# Patient Record
Sex: FEMALE | Race: WHITE | NOT HISPANIC OR LATINO | Employment: OTHER | URBAN - NONMETROPOLITAN AREA
[De-identification: names, ages, dates, MRNs, and addresses within clinical notes are randomized per-mention and may not be internally consistent; named-entity substitution may affect disease eponyms.]

---

## 2017-09-08 ENCOUNTER — OFFICE VISIT (OUTPATIENT)
Dept: RETAIL CLINIC | Facility: CLINIC | Age: 67
End: 2017-09-08

## 2017-09-08 DIAGNOSIS — Z23 NEED FOR DIPHTHERIA-TETANUS-PERTUSSIS (TDAP) VACCINE, ADULT/ADOLESCENT: Primary | ICD-10-CM

## 2017-09-16 ENCOUNTER — APPOINTMENT (OUTPATIENT)
Dept: GENERAL RADIOLOGY | Age: 67
End: 2017-09-16
Payer: MEDICARE

## 2017-09-16 ENCOUNTER — HOSPITAL ENCOUNTER (EMERGENCY)
Age: 67
Discharge: HOME | End: 2017-09-16
Attending: EMERGENCY MEDICINE
Payer: MEDICARE

## 2017-09-16 VITALS
WEIGHT: 148 LBS | DIASTOLIC BLOOD PRESSURE: 60 MMHG | RESPIRATION RATE: 19 BRPM | BODY MASS INDEX: 24.66 KG/M2 | TEMPERATURE: 98.1 F | HEART RATE: 80 BPM | SYSTOLIC BLOOD PRESSURE: 150 MMHG | HEIGHT: 65 IN | OXYGEN SATURATION: 94 %

## 2017-09-16 DIAGNOSIS — S52.502A DISPLACED FRACTURE OF DISTAL END OF LEFT RADIUS, INITIAL ENCOUNTER: Primary | ICD-10-CM

## 2017-09-16 DIAGNOSIS — S22.42XA FRACTURE OF MULTIPLE RIBS OF LEFT SIDE, INITIAL ENCOUNTER: ICD-10-CM

## 2017-09-16 PROCEDURE — 71010 XR CHEST PORTABLE: CPT

## 2017-09-16 PROCEDURE — 6360000002 HC RX W HCPCS: Performed by: EMERGENCY MEDICINE

## 2017-09-16 PROCEDURE — 73110 X-RAY EXAM OF WRIST: CPT

## 2017-09-16 PROCEDURE — 25605 CLTX DST RDL FX/EPHYS SEP W/: CPT

## 2017-09-16 PROCEDURE — 99283 EMERGENCY DEPT VISIT LOW MDM: CPT | Performed by: EMERGENCY MEDICINE

## 2017-09-16 PROCEDURE — 99284 EMERGENCY DEPT VISIT MOD MDM: CPT

## 2017-09-16 PROCEDURE — 99152 MOD SED SAME PHYS/QHP 5/>YRS: CPT | Performed by: EMERGENCY MEDICINE

## 2017-09-16 PROCEDURE — 25600 CLTX DST RDL FX/EPHYS SEP WO: CPT | Performed by: EMERGENCY MEDICINE

## 2017-09-16 PROCEDURE — 96374 THER/PROPH/DIAG INJ IV PUSH: CPT

## 2017-09-16 RX ORDER — ATORVASTATIN CALCIUM 10 MG/1
10 TABLET, FILM COATED ORAL DAILY
COMMUNITY

## 2017-09-16 RX ORDER — LOSARTAN POTASSIUM 25 MG/1
25 TABLET ORAL DAILY
COMMUNITY

## 2017-09-16 RX ORDER — KETOROLAC TROMETHAMINE 30 MG/ML
30 INJECTION, SOLUTION INTRAMUSCULAR; INTRAVENOUS ONCE
Status: COMPLETED | OUTPATIENT
Start: 2017-09-16 | End: 2017-09-16

## 2017-09-16 RX ORDER — HYDROCODONE BITARTRATE AND ACETAMINOPHEN 5; 325 MG/1; MG/1
1 TABLET ORAL EVERY 6 HOURS PRN
Qty: 20 TABLET | Refills: 0 | Status: SHIPPED | OUTPATIENT
Start: 2017-09-16 | End: 2017-09-23

## 2017-09-16 RX ORDER — MORPHINE SULFATE 10 MG/ML
10 INJECTION, SOLUTION INTRAMUSCULAR; INTRAVENOUS ONCE
Status: COMPLETED | OUTPATIENT
Start: 2017-09-16 | End: 2017-09-16

## 2017-09-16 RX ORDER — ONDANSETRON 2 MG/ML
INJECTION INTRAMUSCULAR; INTRAVENOUS
Status: DISCONTINUED
Start: 2017-09-16 | End: 2017-09-16 | Stop reason: HOSPADM

## 2017-09-16 RX ORDER — KETOROLAC TROMETHAMINE 30 MG/ML
60 INJECTION, SOLUTION INTRAMUSCULAR; INTRAVENOUS ONCE
Status: DISCONTINUED | OUTPATIENT
Start: 2017-09-16 | End: 2017-09-16

## 2017-09-16 RX ORDER — MIDAZOLAM HYDROCHLORIDE 1 MG/ML
2 INJECTION INTRAMUSCULAR; INTRAVENOUS ONCE
Status: DISCONTINUED | OUTPATIENT
Start: 2017-09-16 | End: 2017-09-16 | Stop reason: HOSPADM

## 2017-09-16 RX ORDER — MIDAZOLAM HYDROCHLORIDE 1 MG/ML
5 INJECTION INTRAMUSCULAR; INTRAVENOUS ONCE
Status: COMPLETED | OUTPATIENT
Start: 2017-09-16 | End: 2017-09-16

## 2017-09-16 RX ADMIN — MORPHINE SULFATE 10 MG: 10 INJECTION INTRAVENOUS at 06:31

## 2017-09-16 RX ADMIN — MIDAZOLAM 5 MG: 1 INJECTION INTRAMUSCULAR; INTRAVENOUS at 06:30

## 2017-09-16 RX ADMIN — KETOROLAC TROMETHAMINE 30 MG: 30 INJECTION, SOLUTION INTRAMUSCULAR at 06:11

## 2017-09-16 ASSESSMENT — PAIN SCALES - GENERAL
PAINLEVEL_OUTOF10: 5
PAINLEVEL_OUTOF10: 5
PAINLEVEL_OUTOF10: 6

## 2017-09-16 ASSESSMENT — ENCOUNTER SYMPTOMS
RESPIRATORY NEGATIVE: 1
BACK PAIN: 0
EYES NEGATIVE: 1

## 2020-01-31 ENCOUNTER — TRANSCRIBE ORDERS (OUTPATIENT)
Dept: ADMINISTRATIVE | Facility: HOSPITAL | Age: 70
End: 2020-01-31

## 2020-01-31 DIAGNOSIS — G47.33 OBSTRUCTIVE SLEEP APNEA (ADULT) (PEDIATRIC): Primary | ICD-10-CM

## 2020-01-31 DIAGNOSIS — I10 ESSENTIAL (PRIMARY) HYPERTENSION: ICD-10-CM

## 2020-01-31 DIAGNOSIS — R01.1 CARDIAC MURMUR, UNSPECIFIED: ICD-10-CM

## 2020-02-10 ENCOUNTER — HOSPITAL ENCOUNTER (OUTPATIENT)
Dept: CARDIOLOGY | Facility: HOSPITAL | Age: 70
Discharge: HOME | End: 2020-02-10
Admitting: PHYSICIAN ASSISTANT

## 2020-02-10 VITALS
DIASTOLIC BLOOD PRESSURE: 51 MMHG | WEIGHT: 150 LBS | HEIGHT: 64 IN | SYSTOLIC BLOOD PRESSURE: 157 MMHG | BODY MASS INDEX: 25.61 KG/M2

## 2020-02-10 DIAGNOSIS — I10 ESSENTIAL (PRIMARY) HYPERTENSION: ICD-10-CM

## 2020-02-10 DIAGNOSIS — G47.33 OBSTRUCTIVE SLEEP APNEA (ADULT) (PEDIATRIC): ICD-10-CM

## 2020-02-10 DIAGNOSIS — R01.1 CARDIAC MURMUR, UNSPECIFIED: ICD-10-CM

## 2020-02-10 PROCEDURE — 93306 TTE W/DOPPLER COMPLETE: CPT | Performed by: INTERNAL MEDICINE

## 2020-02-10 PROCEDURE — 93306 TTE W/DOPPLER COMPLETE: CPT

## 2020-02-12 LAB
BH CV ECHO MEAS - AO MAX PG (FULL): 3.9 MMHG
BH CV ECHO MEAS - AO MAX PG: 8.5 MMHG
BH CV ECHO MEAS - AO MEAN PG (FULL): 2 MMHG
BH CV ECHO MEAS - AO MEAN PG: 4 MMHG
BH CV ECHO MEAS - AO ROOT AREA (BSA CORRECTED): 1.6
BH CV ECHO MEAS - AO ROOT AREA: 6.2 CM^2
BH CV ECHO MEAS - AO ROOT DIAM: 2.8 CM
BH CV ECHO MEAS - AO V2 MAX: 146 CM/SEC
BH CV ECHO MEAS - AO V2 MEAN: 88.7 CM/SEC
BH CV ECHO MEAS - AO V2 VTI: 32.1 CM
BH CV ECHO MEAS - AVA(I,A): 2.7 CM^2
BH CV ECHO MEAS - AVA(I,D): 2.7 CM^2
BH CV ECHO MEAS - AVA(V,A): 2.3 CM^2
BH CV ECHO MEAS - AVA(V,D): 2.3 CM^2
BH CV ECHO MEAS - BSA(HAYCOCK): 1.8 M^2
BH CV ECHO MEAS - BSA: 1.7 M^2
BH CV ECHO MEAS - BZI_BMI: 25.7 KILOGRAMS/M^2
BH CV ECHO MEAS - BZI_METRIC_HEIGHT: 162.6 CM
BH CV ECHO MEAS - BZI_METRIC_WEIGHT: 68 KG
BH CV ECHO MEAS - EDV(CUBED): 67.9 ML
BH CV ECHO MEAS - EDV(MOD-SP4): 66.9 ML
BH CV ECHO MEAS - EDV(TEICH): 73.4 ML
BH CV ECHO MEAS - EF(CUBED): 77.8 %
BH CV ECHO MEAS - EF(MOD-SP4): 60.1 %
BH CV ECHO MEAS - EF(TEICH): 70.5 %
BH CV ECHO MEAS - ESV(CUBED): 15.1 ML
BH CV ECHO MEAS - ESV(MOD-SP4): 26.7 ML
BH CV ECHO MEAS - ESV(TEICH): 21.7 ML
BH CV ECHO MEAS - FS: 39.5 %
BH CV ECHO MEAS - IVS/LVPW: 1.1
BH CV ECHO MEAS - IVSD: 1.1 CM
BH CV ECHO MEAS - LA DIMENSION: 3.7 CM
BH CV ECHO MEAS - LA/AO: 1.3
BH CV ECHO MEAS - LAT PEAK E' VEL: 9.7 CM/SEC
BH CV ECHO MEAS - LV DIASTOLIC VOL/BSA (35-75): 38.7 ML/M^2
BH CV ECHO MEAS - LV MASS(C)D: 132.9 GRAMS
BH CV ECHO MEAS - LV MASS(C)DI: 76.8 GRAMS/M^2
BH CV ECHO MEAS - LV MAX PG: 4.6 MMHG
BH CV ECHO MEAS - LV MEAN PG: 2 MMHG
BH CV ECHO MEAS - LV SYSTOLIC VOL/BSA (12-30): 15.4 ML/M^2
BH CV ECHO MEAS - LV V1 MAX: 107 CM/SEC
BH CV ECHO MEAS - LV V1 MEAN: 71.8 CM/SEC
BH CV ECHO MEAS - LV V1 VTI: 27.5 CM
BH CV ECHO MEAS - LVIDD: 4.1 CM
BH CV ECHO MEAS - LVIDS: 2.5 CM
BH CV ECHO MEAS - LVLD AP4: 8.1 CM
BH CV ECHO MEAS - LVLS AP4: 7 CM
BH CV ECHO MEAS - LVOT AREA (M): 3.1 CM^2
BH CV ECHO MEAS - LVOT AREA: 3.1 CM^2
BH CV ECHO MEAS - LVOT DIAM: 2 CM
BH CV ECHO MEAS - LVPWD: 0.94 CM
BH CV ECHO MEAS - MED PEAK E' VEL: 6.42 CM/SEC
BH CV ECHO MEAS - MV A MAX VEL: 106 CM/SEC
BH CV ECHO MEAS - MV DEC TIME: 0.26 SEC
BH CV ECHO MEAS - MV E MAX VEL: 97.9 CM/SEC
BH CV ECHO MEAS - MV E/A: 0.92
BH CV ECHO MEAS - RAP SYSTOLE: 5 MMHG
BH CV ECHO MEAS - RVSP: 26 MMHG
BH CV ECHO MEAS - SI(AO): 114.2 ML/M^2
BH CV ECHO MEAS - SI(CUBED): 30.5 ML/M^2
BH CV ECHO MEAS - SI(LVOT): 49.9 ML/M^2
BH CV ECHO MEAS - SI(MOD-SP4): 23.2 ML/M^2
BH CV ECHO MEAS - SI(TEICH): 29.9 ML/M^2
BH CV ECHO MEAS - SV(AO): 197.7 ML
BH CV ECHO MEAS - SV(CUBED): 52.8 ML
BH CV ECHO MEAS - SV(LVOT): 86.4 ML
BH CV ECHO MEAS - SV(MOD-SP4): 40.2 ML
BH CV ECHO MEAS - SV(TEICH): 51.7 ML
BH CV ECHO MEAS - TR MAX VEL: 229 CM/SEC
BH CV ECHO MEASUREMENTS AVERAGE E/E' RATIO: 12.15
LEFT ATRIUM VOLUME INDEX: 34.7 ML/M2
LEFT ATRIUM VOLUME: 60 CM3
MAXIMAL PREDICTED HEART RATE: 149 BPM
STRESS TARGET HR: 127 BPM

## 2024-01-25 ENCOUNTER — TRANSCRIBE ORDERS (OUTPATIENT)
Dept: ADMINISTRATIVE | Facility: HOSPITAL | Age: 74
End: 2024-01-25
Payer: MEDICARE

## 2024-01-25 DIAGNOSIS — R07.9 CHEST PAIN, UNSPECIFIED TYPE: Primary | ICD-10-CM

## 2024-01-31 ENCOUNTER — HOSPITAL ENCOUNTER (OUTPATIENT)
Dept: CARDIOLOGY | Facility: HOSPITAL | Age: 74
Discharge: HOME | End: 2024-01-31
Admitting: INTERNAL MEDICINE
Payer: MEDICARE

## 2024-01-31 VITALS
WEIGHT: 150 LBS | SYSTOLIC BLOOD PRESSURE: 163 MMHG | HEART RATE: 66 BPM | HEIGHT: 63 IN | DIASTOLIC BLOOD PRESSURE: 58 MMHG | BODY MASS INDEX: 26.58 KG/M2

## 2024-01-31 DIAGNOSIS — R07.9 CHEST PAIN, UNSPECIFIED TYPE: ICD-10-CM

## 2024-01-31 PROCEDURE — 25510000001 PERFLUTREN 6.52 MG/ML SUSPENSION: Performed by: INTERNAL MEDICINE

## 2024-01-31 PROCEDURE — 93350 STRESS TTE ONLY: CPT

## 2024-01-31 PROCEDURE — 93017 CV STRESS TEST TRACING ONLY: CPT

## 2024-01-31 RX ORDER — THIAMINE HCL 100 MG
TABLET ORAL DAILY
COMMUNITY

## 2024-01-31 RX ORDER — ACETAMINOPHEN 160 MG
2000 TABLET,DISINTEGRATING ORAL DAILY
COMMUNITY

## 2024-01-31 RX ORDER — OLMESARTAN MEDOXOMIL 20 MG/1
20 TABLET ORAL DAILY
COMMUNITY

## 2024-01-31 RX ORDER — PHENOL 1.4 %
1 AEROSOL, SPRAY (ML) MUCOUS MEMBRANE NIGHTLY
COMMUNITY

## 2024-01-31 RX ORDER — ATORVASTATIN CALCIUM 10 MG/1
10 TABLET, FILM COATED ORAL DAILY
COMMUNITY

## 2024-01-31 RX ORDER — ANTIOX #8/OM3/DHA/EPA/LUT/ZEAX 250-2.5 MG
1 CAPSULE ORAL 2 TIMES DAILY
COMMUNITY

## 2024-01-31 RX ORDER — OMEPRAZOLE 40 MG/1
40 CAPSULE, DELAYED RELEASE ORAL DAILY
COMMUNITY

## 2024-01-31 RX ORDER — MULTIVITAMIN WITH IRON
1 TABLET ORAL DAILY
COMMUNITY

## 2024-01-31 RX ADMIN — PERFLUTREN 8.48 MG: 6.52 INJECTION, SUSPENSION INTRAVENOUS at 14:35

## 2024-02-02 LAB
BH CV STRESS BP STAGE 1: NORMAL
BH CV STRESS BP STAGE 2: NORMAL
BH CV STRESS DURATION MIN STAGE 1: 3
BH CV STRESS DURATION MIN STAGE 2: 3
BH CV STRESS DURATION SEC STAGE 1: 0
BH CV STRESS DURATION SEC STAGE 2: 0
BH CV STRESS GRADE STAGE 1: 10
BH CV STRESS GRADE STAGE 2: 12
BH CV STRESS HR STAGE 1: 107
BH CV STRESS HR STAGE 2: 125
BH CV STRESS METS STAGE 1: 5
BH CV STRESS METS STAGE 2: 7.5
BH CV STRESS PROTOCOL 1: NORMAL
BH CV STRESS RECOVERY BP: NORMAL MMHG
BH CV STRESS RECOVERY HR: 80 BPM
BH CV STRESS SPEED STAGE 1: 1.7
BH CV STRESS SPEED STAGE 2: 2.5
BH CV STRESS STAGE 1: 1
BH CV STRESS STAGE 2: 2
MAXIMAL PREDICTED HEART RATE: 145 BPM
PERCENT MAX PREDICTED HR: 86.21 %
STRESS BASELINE BP: NORMAL MMHG
STRESS BASELINE HR: 66 BPM
STRESS PERCENT HR: 101 %
STRESS POST ESTIMATED WORKLOAD: 7.5 METS
STRESS POST EXERCISE DUR MIN: 6 MIN
STRESS POST EXERCISE DUR SEC: 0 SEC
STRESS POST PEAK BP: NORMAL MMHG
STRESS POST PEAK HR: 125 BPM
STRESS TARGET HR: 123 BPM

## 2024-02-06 ENCOUNTER — OFFICE VISIT (OUTPATIENT)
Dept: CARDIOLOGY | Facility: CLINIC | Age: 74
End: 2024-02-06
Payer: MEDICARE

## 2024-02-06 ENCOUNTER — PREP FOR SURGERY (OUTPATIENT)
Dept: OTHER | Facility: HOSPITAL | Age: 74
End: 2024-02-06
Payer: MEDICARE

## 2024-02-06 VITALS
SYSTOLIC BLOOD PRESSURE: 130 MMHG | OXYGEN SATURATION: 98 % | HEIGHT: 64 IN | BODY MASS INDEX: 26.46 KG/M2 | WEIGHT: 155 LBS | HEART RATE: 68 BPM | DIASTOLIC BLOOD PRESSURE: 58 MMHG

## 2024-02-06 DIAGNOSIS — E78.5 DYSLIPIDEMIA: ICD-10-CM

## 2024-02-06 DIAGNOSIS — I10 ESSENTIAL HYPERTENSION: ICD-10-CM

## 2024-02-06 DIAGNOSIS — R94.39 ABNORMAL STRESS ECHO: ICD-10-CM

## 2024-02-06 DIAGNOSIS — I20.89 CHRONIC STABLE ANGINA: Primary | ICD-10-CM

## 2024-02-06 PROBLEM — G47.33 OSA (OBSTRUCTIVE SLEEP APNEA): Status: ACTIVE | Noted: 2024-02-06

## 2024-02-06 PROBLEM — K21.9 GERD (GASTROESOPHAGEAL REFLUX DISEASE): Status: ACTIVE | Noted: 2024-02-06

## 2024-02-06 PROCEDURE — 93000 ELECTROCARDIOGRAM COMPLETE: CPT | Performed by: INTERNAL MEDICINE

## 2024-02-06 PROCEDURE — 3078F DIAST BP <80 MM HG: CPT | Performed by: INTERNAL MEDICINE

## 2024-02-06 PROCEDURE — 3075F SYST BP GE 130 - 139MM HG: CPT | Performed by: INTERNAL MEDICINE

## 2024-02-06 PROCEDURE — 99204 OFFICE O/P NEW MOD 45 MIN: CPT | Performed by: INTERNAL MEDICINE

## 2024-02-06 RX ORDER — NITROGLYCERIN 0.4 MG/1
TABLET SUBLINGUAL
Qty: 25 TABLET | Refills: 11 | Status: SHIPPED | OUTPATIENT
Start: 2024-02-06

## 2024-02-06 RX ORDER — SODIUM CHLORIDE 9 MG/ML
40 INJECTION, SOLUTION INTRAVENOUS AS NEEDED
OUTPATIENT
Start: 2024-02-06

## 2024-02-06 RX ORDER — ASPIRIN 81 MG/1
81 TABLET ORAL DAILY
Qty: 30 TABLET | Refills: 11 | Status: SHIPPED | OUTPATIENT
Start: 2024-02-06

## 2024-02-06 RX ORDER — SODIUM CHLORIDE 0.9 % (FLUSH) 0.9 %
10 SYRINGE (ML) INJECTION EVERY 12 HOURS SCHEDULED
OUTPATIENT
Start: 2024-02-06

## 2024-02-06 RX ORDER — SODIUM CHLORIDE 0.9 % (FLUSH) 0.9 %
10 SYRINGE (ML) INJECTION AS NEEDED
OUTPATIENT
Start: 2024-02-06

## 2024-02-06 RX ORDER — METOPROLOL SUCCINATE 25 MG/1
25 TABLET, EXTENDED RELEASE ORAL DAILY
Qty: 30 TABLET | Refills: 11 | Status: SHIPPED | OUTPATIENT
Start: 2024-02-06

## 2024-02-06 RX ORDER — ATORVASTATIN CALCIUM 20 MG/1
20 TABLET, FILM COATED ORAL DAILY
Qty: 30 TABLET | Refills: 11 | Status: SHIPPED | OUTPATIENT
Start: 2024-02-06

## 2024-02-06 NOTE — LETTER
February 6, 2024     Cabrera Robles MD  [address]  [facility] [address]  [facility] [address]    Patient: Sasha Jimenez   YOB: 1950   Date of Visit: 2/6/2024       Dear Cabrera Robles MD,    Thank you for referring Sasha Jimenez to me for evaluation. Below is a copy of my consult note.    If you have questions, please do not hesitate to call me. I look forward to following Sasha along with you.         Sincerely,        Mukund Posada MD        CC: No Recipients      Reason for Visit: Abnormal stress test    HPI:  Sasha Jimenez is a 73 y.o. female is being seen for consultation today at the request of Cabrera Robles MD for evaluation of an abnormal stress test.  Stress echo from 2/2/2024 reported nonlimiting chest discomfort with stress, 0.5 mm horizontal ST depression, and possible inferior wall hypokinesis.    The chest pain happens when she exerts herself.  It started about 6-8 months ago.  Light activity around the house does not bother her, but more strenuous consistent walking causes it to happen.  She describes it as a pressure like pain that radiates up to her stroke and is different than her acid reflux.  She feels more tired and sleepy lately.  She has sleep apnea and could not tolerate the mask.      Previous Cardiac Testing and Procedures:  -Echo (2/10/2020) EF 56- 60%, normal diastolic function, normal RV size and function, LA dilation, no significant valve dysfunction  -Stress echo (2/2/2024) resting hypertension with hypertensive response to stress, nonlimiting chest discomfort with stress, 0.5 mm horizontal ST depression, possible inferior wall hypokinesis with stress, intermediate risk    Lab data:  -Lipid panel (7/25/2023) total cholesterol 196, HDL 59, , triglycerides 81  -Lipid panel (1/12/2024) total cholesterol 197, HDL 58, , triglycerides 106  -BMP (1/12/2024) creatinine 0.73, potassium 4.8, sodium 140  -TSH (7/25/2023) 1.19    Patient Active  Problem List   Diagnosis   • Essential hypertension   • Dyslipidemia   • EVIE (obstructive sleep apnea)   • GERD (gastroesophageal reflux disease)       Social History     Tobacco Use   • Smoking status: Former     Types: Cigarettes   • Smokeless tobacco: Never   Vaping Use   • Vaping Use: Never used   Substance Use Topics   • Alcohol use: Never   • Drug use: Never       Family History   Problem Relation Age of Onset   • Heart attack Father    • Heart disease Father    • Heart failure Father        The following portions of the patient's history were reviewed and updated as appropriate: allergies, current medications, past family history, past medical history, past social history, past surgical history, and problem list.      Current Outpatient Medications:   •  atorvastatin (LIPITOR) 20 MG tablet, Take 1 tablet by mouth Daily., Disp: 30 tablet, Rfl: 11  •  Cholecalciferol (Vitamin D3) 50 MCG (2000 UT) capsule, Take 1 capsule by mouth Daily., Disp: , Rfl:   •  Magnesium 250 MG tablet, Take 1 tablet by mouth Daily., Disp: , Rfl:   •  Melatonin 10 MG tablet, Take 1 tablet by mouth Every Night., Disp: , Rfl:   •  multivitamins-minerals (PRESERVISION AREDS 2) capsule capsule, Take 1 capsule by mouth 2 (Two) Times a Day., Disp: , Rfl:   •  olmesartan (BENICAR) 20 MG tablet, Take 1 tablet by mouth Daily., Disp: , Rfl:   •  omeprazole (priLOSEC) 40 MG capsule, Take 1 capsule by mouth Daily., Disp: , Rfl:   •  vitamin B-12 (CYANOCOBALAMIN) 2500 MCG sublingual tablet tablet, Place  under the tongue Daily., Disp: , Rfl:   •  aspirin 81 MG EC tablet, Take 1 tablet by mouth Daily., Disp: 30 tablet, Rfl: 11  •  metoprolol succinate XL (TOPROL-XL) 25 MG 24 hr tablet, Take 1 tablet by mouth Daily., Disp: 30 tablet, Rfl: 11  •  nitroglycerin (NITROSTAT) 0.4 MG SL tablet, 1 under the tongue as needed for angina, may repeat q5mins for up three doses, Disp: 25 tablet, Rfl: 11    Review of Systems   Constitutional: Negative for chills  "and fever.   Cardiovascular:  Positive for chest pain. Negative for paroxysmal nocturnal dyspnea.   Respiratory:  Negative for cough and shortness of breath.    Skin:  Negative for rash.   Gastrointestinal:  Negative for abdominal pain and heartburn.   Neurological:  Negative for dizziness and numbness.       Objective  /58 (BP Location: Left arm, Patient Position: Sitting, Cuff Size: Adult)   Pulse 68   Ht 162.6 cm (64\")   Wt 70.3 kg (155 lb)   SpO2 98%   BMI 26.61 kg/m²   Constitutional:       Appearance: Well-developed.   HENT:      Head: Normocephalic and atraumatic.   Pulmonary:      Effort: Pulmonary effort is normal.      Breath sounds: Normal breath sounds.   Cardiovascular:      Normal rate. Regular rhythm.      Murmurs: There is no murmur.      No gallop.    Edema:     Peripheral edema absent.   Skin:     General: Skin is warm and dry.   Neurological:      Mental Status: Alert and oriented to person, place, and time.         ECG 12 Lead    Date/Time: 2/6/2024 3:20 PM  Performed by: Mukund Posada MD    Authorized by: Mukund Posada MD  Previous ECG: no previous ECG available  Rhythm: sinus rhythm  Rate: normal    Clinical impression: normal ECG            ICD-10-CM ICD-9-CM   1. Chronic stable angina  I20.89 413.9   2. Abnormal stress echo  R94.39 794.39   3. Dyslipidemia  E78.5 272.4   4. Essential hypertension  I10 401.9         Assessment/Plan:  1.  Chest pain: Symptoms consistent with stable angina.  We had a long discussion of treatment options including trial of medical therapy versus medical therapy along with a heart catheterization.  She elects for proceeding forward with heart catheterization.  Start aspirin, metoprolol, and sublingual nitroglycerin.  Titrate up atorvastatin to 20 mg.    2.  Abnormal stress echo: Stress echo from 2/2/2024 reported chest pain with exercise, 0.5 mm ST depression, and possible inferior hypokinesis.  Overall, intermediate risk stress.    3.  " Dyslipidemia: Suboptimal control on lipid panel from 1/12/2024 with .  Titrate up atorvastatin to 20 mg given presence of coronary artery disease.    4.  Essential hypertension: Blood pressure is acceptable today.  Continue olmesartan.

## 2024-02-06 NOTE — H&P (VIEW-ONLY)
Reason for Visit: Abnormal stress test    HPI:  Sasha Jimenez is a 73 y.o. female is being seen for consultation today at the request of Cabrera Robles MD for evaluation of an abnormal stress test.  Stress echo from 2/2/2024 reported nonlimiting chest discomfort with stress, 0.5 mm horizontal ST depression, and possible inferior wall hypokinesis.    The chest pain happens when she exerts herself.  It started about 6-8 months ago.  Light activity around the house does not bother her, but more strenuous consistent walking causes it to happen.  She describes it as a pressure like pain that radiates up to her stroke and is different than her acid reflux.  She feels more tired and sleepy lately.  She has sleep apnea and could not tolerate the mask.      Previous Cardiac Testing and Procedures:  -Echo (2/10/2020) EF 56- 60%, normal diastolic function, normal RV size and function, LA dilation, no significant valve dysfunction  -Stress echo (2/2/2024) resting hypertension with hypertensive response to stress, nonlimiting chest discomfort with stress, 0.5 mm horizontal ST depression, possible inferior wall hypokinesis with stress, intermediate risk    Lab data:  -Lipid panel (7/25/2023) total cholesterol 196, HDL 59, , triglycerides 81  -Lipid panel (1/12/2024) total cholesterol 197, HDL 58, , triglycerides 106  -BMP (1/12/2024) creatinine 0.73, potassium 4.8, sodium 140  -TSH (7/25/2023) 1.19    Patient Active Problem List   Diagnosis    Essential hypertension    Dyslipidemia    EVIE (obstructive sleep apnea)    GERD (gastroesophageal reflux disease)       Social History     Tobacco Use    Smoking status: Former     Types: Cigarettes    Smokeless tobacco: Never   Vaping Use    Vaping Use: Never used   Substance Use Topics    Alcohol use: Never    Drug use: Never       Family History   Problem Relation Age of Onset    Heart attack Father     Heart disease Father     Heart failure Father        The  "following portions of the patient's history were reviewed and updated as appropriate: allergies, current medications, past family history, past medical history, past social history, past surgical history, and problem list.      Current Outpatient Medications:     atorvastatin (LIPITOR) 20 MG tablet, Take 1 tablet by mouth Daily., Disp: 30 tablet, Rfl: 11    Cholecalciferol (Vitamin D3) 50 MCG (2000 UT) capsule, Take 1 capsule by mouth Daily., Disp: , Rfl:     Magnesium 250 MG tablet, Take 1 tablet by mouth Daily., Disp: , Rfl:     Melatonin 10 MG tablet, Take 1 tablet by mouth Every Night., Disp: , Rfl:     multivitamins-minerals (PRESERVISION AREDS 2) capsule capsule, Take 1 capsule by mouth 2 (Two) Times a Day., Disp: , Rfl:     olmesartan (BENICAR) 20 MG tablet, Take 1 tablet by mouth Daily., Disp: , Rfl:     omeprazole (priLOSEC) 40 MG capsule, Take 1 capsule by mouth Daily., Disp: , Rfl:     vitamin B-12 (CYANOCOBALAMIN) 2500 MCG sublingual tablet tablet, Place  under the tongue Daily., Disp: , Rfl:     aspirin 81 MG EC tablet, Take 1 tablet by mouth Daily., Disp: 30 tablet, Rfl: 11    metoprolol succinate XL (TOPROL-XL) 25 MG 24 hr tablet, Take 1 tablet by mouth Daily., Disp: 30 tablet, Rfl: 11    nitroglycerin (NITROSTAT) 0.4 MG SL tablet, 1 under the tongue as needed for angina, may repeat q5mins for up three doses, Disp: 25 tablet, Rfl: 11    Review of Systems   Constitutional: Negative for chills and fever.   Cardiovascular:  Positive for chest pain. Negative for paroxysmal nocturnal dyspnea.   Respiratory:  Negative for cough and shortness of breath.    Skin:  Negative for rash.   Gastrointestinal:  Negative for abdominal pain and heartburn.   Neurological:  Negative for dizziness and numbness.       Objective   /58 (BP Location: Left arm, Patient Position: Sitting, Cuff Size: Adult)   Pulse 68   Ht 162.6 cm (64\")   Wt 70.3 kg (155 lb)   SpO2 98%   BMI 26.61 kg/m²   Constitutional:       " Appearance: Well-developed.   HENT:      Head: Normocephalic and atraumatic.   Pulmonary:      Effort: Pulmonary effort is normal.      Breath sounds: Normal breath sounds.   Cardiovascular:      Normal rate. Regular rhythm.      Murmurs: There is no murmur.      No gallop.    Edema:     Peripheral edema absent.   Skin:     General: Skin is warm and dry.   Neurological:      Mental Status: Alert and oriented to person, place, and time.         ECG 12 Lead    Date/Time: 2/6/2024 3:20 PM  Performed by: Mukund Posada MD    Authorized by: Mukund Posada MD  Previous ECG: no previous ECG available  Rhythm: sinus rhythm  Rate: normal    Clinical impression: normal ECG            ICD-10-CM ICD-9-CM   1. Chronic stable angina  I20.89 413.9   2. Abnormal stress echo  R94.39 794.39   3. Dyslipidemia  E78.5 272.4   4. Essential hypertension  I10 401.9         Assessment/Plan:  1.  Chest pain: Symptoms consistent with stable angina.  We had a long discussion of treatment options including trial of medical therapy versus medical therapy along with a heart catheterization.  She elects for proceeding forward with heart catheterization.  Start aspirin, metoprolol, and sublingual nitroglycerin.  Titrate up atorvastatin to 20 mg.    2.  Abnormal stress echo: Stress echo from 2/2/2024 reported chest pain with exercise, 0.5 mm ST depression, and possible inferior hypokinesis.  Overall, intermediate risk stress.    3.  Dyslipidemia: Suboptimal control on lipid panel from 1/12/2024 with .  Titrate up atorvastatin to 20 mg given presence of coronary artery disease.    4.  Essential hypertension: Blood pressure is acceptable today.  Continue olmesartan.

## 2024-02-16 ENCOUNTER — HOSPITAL ENCOUNTER (OUTPATIENT)
Facility: HOSPITAL | Age: 74
Setting detail: HOSPITAL OUTPATIENT SURGERY
Discharge: HOME | End: 2024-02-16
Attending: INTERNAL MEDICINE | Admitting: INTERNAL MEDICINE
Payer: MEDICARE

## 2024-02-16 VITALS
SYSTOLIC BLOOD PRESSURE: 141 MMHG | WEIGHT: 156 LBS | DIASTOLIC BLOOD PRESSURE: 66 MMHG | RESPIRATION RATE: 16 BRPM | OXYGEN SATURATION: 95 % | HEART RATE: 51 BPM | HEIGHT: 64 IN | TEMPERATURE: 97.6 F | BODY MASS INDEX: 26.63 KG/M2

## 2024-02-16 DIAGNOSIS — R94.39 ABNORMAL STRESS ECHO: ICD-10-CM

## 2024-02-16 DIAGNOSIS — I20.89 CHRONIC STABLE ANGINA: ICD-10-CM

## 2024-02-16 LAB
ALBUMIN SERPL-MCNC: 4 G/DL (ref 3.5–5.2)
ALBUMIN/GLOB SERPL: 1.5 G/DL
ALP SERPL-CCNC: 73 U/L (ref 39–117)
ALT SERPL W P-5'-P-CCNC: 17 U/L (ref 1–33)
ANION GAP SERPL CALCULATED.3IONS-SCNC: 7 MMOL/L (ref 5–15)
AST SERPL-CCNC: 15 U/L (ref 1–32)
BASOPHILS # BLD AUTO: 0.04 10*3/MM3 (ref 0–0.2)
BASOPHILS NFR BLD AUTO: 0.5 % (ref 0–1.5)
BILIRUB SERPL-MCNC: 0.3 MG/DL (ref 0–1.2)
BUN SERPL-MCNC: 19 MG/DL (ref 8–23)
BUN/CREAT SERPL: 27.9 (ref 7–25)
CALCIUM SPEC-SCNC: 8.7 MG/DL (ref 8.6–10.5)
CHLORIDE SERPL-SCNC: 106 MMOL/L (ref 98–107)
CHOLEST SERPL-MCNC: 155 MG/DL (ref 0–200)
CO2 SERPL-SCNC: 27 MMOL/L (ref 22–29)
CREAT SERPL-MCNC: 0.68 MG/DL (ref 0.57–1)
DEPRECATED RDW RBC AUTO: 43.8 FL (ref 37–54)
EGFRCR SERPLBLD CKD-EPI 2021: 91 ML/MIN/1.73
EOSINOPHIL # BLD AUTO: 0.08 10*3/MM3 (ref 0–0.4)
EOSINOPHIL NFR BLD AUTO: 0.9 % (ref 0.3–6.2)
ERYTHROCYTE [DISTWIDTH] IN BLOOD BY AUTOMATED COUNT: 13.3 % (ref 12.3–15.4)
GLOBULIN UR ELPH-MCNC: 2.7 GM/DL
GLUCOSE SERPL-MCNC: 105 MG/DL (ref 65–99)
HCT VFR BLD AUTO: 36.8 % (ref 34–46.6)
HDLC SERPL-MCNC: 59 MG/DL (ref 40–60)
HGB BLD-MCNC: 11.9 G/DL (ref 12–15.9)
IMM GRANULOCYTES # BLD AUTO: 0.04 10*3/MM3 (ref 0–0.05)
IMM GRANULOCYTES NFR BLD AUTO: 0.5 % (ref 0–0.5)
LDLC SERPL CALC-MCNC: 82 MG/DL (ref 0–100)
LDLC/HDLC SERPL: 1.39 {RATIO}
LYMPHOCYTES # BLD AUTO: 1.68 10*3/MM3 (ref 0.7–3.1)
LYMPHOCYTES NFR BLD AUTO: 19.9 % (ref 19.6–45.3)
MCH RBC QN AUTO: 29.2 PG (ref 26.6–33)
MCHC RBC AUTO-ENTMCNC: 32.3 G/DL (ref 31.5–35.7)
MCV RBC AUTO: 90.2 FL (ref 79–97)
MONOCYTES # BLD AUTO: 0.66 10*3/MM3 (ref 0.1–0.9)
MONOCYTES NFR BLD AUTO: 7.8 % (ref 5–12)
NEUTROPHILS NFR BLD AUTO: 5.93 10*3/MM3 (ref 1.7–7)
NEUTROPHILS NFR BLD AUTO: 70.4 % (ref 42.7–76)
NRBC BLD AUTO-RTO: 0 /100 WBC (ref 0–0.2)
PLATELET # BLD AUTO: 252 10*3/MM3 (ref 140–450)
PMV BLD AUTO: 11.1 FL (ref 6–12)
POTASSIUM SERPL-SCNC: 4.6 MMOL/L (ref 3.5–5.2)
PROT SERPL-MCNC: 6.7 G/DL (ref 6–8.5)
RBC # BLD AUTO: 4.08 10*6/MM3 (ref 3.77–5.28)
SODIUM SERPL-SCNC: 140 MMOL/L (ref 136–145)
TRIGL SERPL-MCNC: 69 MG/DL (ref 0–150)
VLDLC SERPL-MCNC: 14 MG/DL (ref 5–40)
WBC NRBC COR # BLD AUTO: 8.43 10*3/MM3 (ref 3.4–10.8)

## 2024-02-16 PROCEDURE — 25010000002 MIDAZOLAM PER 1 MG: Performed by: INTERNAL MEDICINE

## 2024-02-16 PROCEDURE — C1769 GUIDE WIRE: HCPCS | Performed by: INTERNAL MEDICINE

## 2024-02-16 PROCEDURE — 25510000001 IOPAMIDOL PER 1 ML: Performed by: INTERNAL MEDICINE

## 2024-02-16 PROCEDURE — 99152 MOD SED SAME PHYS/QHP 5/>YRS: CPT | Performed by: INTERNAL MEDICINE

## 2024-02-16 PROCEDURE — 93454 CORONARY ARTERY ANGIO S&I: CPT | Performed by: INTERNAL MEDICINE

## 2024-02-16 PROCEDURE — C1894 INTRO/SHEATH, NON-LASER: HCPCS | Performed by: INTERNAL MEDICINE

## 2024-02-16 PROCEDURE — 80053 COMPREHEN METABOLIC PANEL: CPT | Performed by: INTERNAL MEDICINE

## 2024-02-16 PROCEDURE — 25010000002 HEPARIN (PORCINE) 2000-0.9 UNIT/L-% SOLUTION: Performed by: INTERNAL MEDICINE

## 2024-02-16 PROCEDURE — 85025 COMPLETE CBC W/AUTO DIFF WBC: CPT | Performed by: INTERNAL MEDICINE

## 2024-02-16 PROCEDURE — 25010000002 FENTANYL CITRATE (PF) 50 MCG/ML SOLUTION: Performed by: INTERNAL MEDICINE

## 2024-02-16 PROCEDURE — 25010000002 HEPARIN (PORCINE) 1000-0.9 UT/500ML-% SOLUTION: Performed by: INTERNAL MEDICINE

## 2024-02-16 PROCEDURE — 25010000002 DIPHENHYDRAMINE PER 50 MG: Performed by: INTERNAL MEDICINE

## 2024-02-16 PROCEDURE — 25010000002 HEPARIN (PORCINE) PER 1000 UNITS: Performed by: INTERNAL MEDICINE

## 2024-02-16 PROCEDURE — 80061 LIPID PANEL: CPT | Performed by: INTERNAL MEDICINE

## 2024-02-16 RX ORDER — HEPARIN SODIUM 200 [USP'U]/100ML
INJECTION, SOLUTION INTRAVENOUS
Status: DISCONTINUED | OUTPATIENT
Start: 2024-02-16 | End: 2024-02-16 | Stop reason: HOSPADM

## 2024-02-16 RX ORDER — FENTANYL CITRATE 50 UG/ML
INJECTION, SOLUTION INTRAMUSCULAR; INTRAVENOUS
Status: DISCONTINUED | OUTPATIENT
Start: 2024-02-16 | End: 2024-02-16 | Stop reason: HOSPADM

## 2024-02-16 RX ORDER — ACETAMINOPHEN 325 MG/1
650 TABLET ORAL EVERY 4 HOURS PRN
Status: CANCELLED | OUTPATIENT
Start: 2024-02-16

## 2024-02-16 RX ORDER — MAGNESIUM HYDROXIDE 1200 MG/15ML
LIQUID ORAL
Status: DISCONTINUED | OUTPATIENT
Start: 2024-02-16 | End: 2024-02-16 | Stop reason: HOSPADM

## 2024-02-16 RX ORDER — LIDOCAINE HYDROCHLORIDE 20 MG/ML
INJECTION, SOLUTION INFILTRATION; PERINEURAL
Status: DISCONTINUED | OUTPATIENT
Start: 2024-02-16 | End: 2024-02-16 | Stop reason: HOSPADM

## 2024-02-16 RX ORDER — SODIUM CHLORIDE 9 MG/ML
40 INJECTION, SOLUTION INTRAVENOUS AS NEEDED
Status: DISCONTINUED | OUTPATIENT
Start: 2024-02-16 | End: 2024-02-16 | Stop reason: HOSPADM

## 2024-02-16 RX ORDER — SODIUM CHLORIDE 0.9 % (FLUSH) 0.9 %
10 SYRINGE (ML) INJECTION EVERY 12 HOURS SCHEDULED
Status: DISCONTINUED | OUTPATIENT
Start: 2024-02-16 | End: 2024-02-16 | Stop reason: HOSPADM

## 2024-02-16 RX ORDER — MIDAZOLAM HYDROCHLORIDE 1 MG/ML
INJECTION INTRAMUSCULAR; INTRAVENOUS
Status: DISCONTINUED | OUTPATIENT
Start: 2024-02-16 | End: 2024-02-16 | Stop reason: HOSPADM

## 2024-02-16 RX ORDER — HEPARIN SODIUM 1000 [USP'U]/ML
INJECTION, SOLUTION INTRAVENOUS; SUBCUTANEOUS
Status: DISCONTINUED | OUTPATIENT
Start: 2024-02-16 | End: 2024-02-16 | Stop reason: HOSPADM

## 2024-02-16 RX ORDER — DIPHENHYDRAMINE HYDROCHLORIDE 50 MG/ML
INJECTION INTRAMUSCULAR; INTRAVENOUS
Status: DISCONTINUED | OUTPATIENT
Start: 2024-02-16 | End: 2024-02-16 | Stop reason: HOSPADM

## 2024-02-16 RX ORDER — VERAPAMIL HYDROCHLORIDE 2.5 MG/ML
INJECTION, SOLUTION INTRAVENOUS
Status: DISCONTINUED | OUTPATIENT
Start: 2024-02-16 | End: 2024-02-16 | Stop reason: HOSPADM

## 2024-02-16 RX ORDER — SODIUM CHLORIDE 0.9 % (FLUSH) 0.9 %
10 SYRINGE (ML) INJECTION AS NEEDED
Status: DISCONTINUED | OUTPATIENT
Start: 2024-02-16 | End: 2024-02-16 | Stop reason: HOSPADM

## 2024-02-16 NOTE — Clinical Note
A 6 fr sheath was  inserted using micropuncture technique into the right radial artery. Sheath insertion not delayed.

## 2024-02-16 NOTE — Clinical Note
Hemostasis started on the right radial artery. R-Band was used in achieving hemostasis. Radial compression device applied to vessel. Hemostasis achieved successfully. Closure device additional comment: 14 of air

## 2024-04-01 ENCOUNTER — OFFICE VISIT (OUTPATIENT)
Dept: CARDIOLOGY | Facility: CLINIC | Age: 74
End: 2024-04-01
Payer: MEDICARE

## 2024-04-01 VITALS
DIASTOLIC BLOOD PRESSURE: 58 MMHG | BODY MASS INDEX: 26.63 KG/M2 | WEIGHT: 156 LBS | HEIGHT: 64 IN | OXYGEN SATURATION: 98 % | SYSTOLIC BLOOD PRESSURE: 118 MMHG | HEART RATE: 77 BPM

## 2024-04-01 DIAGNOSIS — I10 ESSENTIAL HYPERTENSION: ICD-10-CM

## 2024-04-01 DIAGNOSIS — I25.118 CORONARY ARTERY DISEASE OF NATIVE ARTERY OF NATIVE HEART WITH STABLE ANGINA PECTORIS: Primary | ICD-10-CM

## 2024-04-01 DIAGNOSIS — E78.5 DYSLIPIDEMIA: ICD-10-CM

## 2024-04-01 PROCEDURE — 3078F DIAST BP <80 MM HG: CPT | Performed by: NURSE PRACTITIONER

## 2024-04-01 PROCEDURE — 1159F MED LIST DOCD IN RCRD: CPT | Performed by: NURSE PRACTITIONER

## 2024-04-01 PROCEDURE — 1160F RVW MEDS BY RX/DR IN RCRD: CPT | Performed by: NURSE PRACTITIONER

## 2024-04-01 PROCEDURE — 99214 OFFICE O/P EST MOD 30 MIN: CPT | Performed by: NURSE PRACTITIONER

## 2024-04-01 PROCEDURE — 3074F SYST BP LT 130 MM HG: CPT | Performed by: NURSE PRACTITIONER

## 2024-04-01 NOTE — PROGRESS NOTES
Subjective:     Encounter Date: 04/01/2024      Patient ID: Sasha Jimenez is a 73 y.o. female with chest pain, abnormal stress test, hypertension and dyslipidemia.    Chief Complaint: routine follow up   History of Present Illness  Patient presents today for management of chest pain. Patient underwent stress eco 2/2/2024 reported chest pain with exercise, 0.5 mm ST depression, and possible inferior hypokinesis. Overall, intermediate risk stress. LHC was done 2/16/2024 that showed mild nonobstructive CAD. LOV atorvastatin was increased to 20 mg.   Today she reports that she has been doing about the same. She reports that she has continued to have occasional chest pain when walking briskly. She reports that pain stops with she slows her pace or rests. She denies any dyspnea on exertion. She denies any heart racing or palpitations. She denies any dizziness or near syncope. She denies any leg swelling, orthopnea or PND. She reports that her BP has been fluctuating. It runs 120-140/70's. She states that at times it can be elevated up to 160's systolic. Patient follows with Dr Robles      Previous Cardiac Testing and Procedures:  -Echo (2/10/2020) EF 56- 60%, normal diastolic function, normal RV size and function, LA dilation, no significant valve dysfunction  -Stress echo (2/2/2024) resting hypertension with hypertensive response to stress, nonlimiting chest discomfort with stress, 0.5 mm horizontal ST depression, possible inferior wall hypokinesis with stress, intermediate risk  -Lipid panel (7/25/2023) total cholesterol 196, HDL 59, , triglycerides 81  -Lipid panel (1/12/2024) total cholesterol 197, HDL 58, , triglycerides 106  -BMP (1/12/2024) creatinine 0.73, potassium 4.8, sodium 140  -TSH (7/25/2023) 1.19  -LHC (2/16/2024): mild nonobstructive CAD      The following portions of the patient's history were reviewed and updated as appropriate: allergies, current medications, past family history,  past medical history, past social history, past surgical history and problem list.    Allergies   Allergen Reactions    Codeine Other (See Comments)     syncope    Hydrocodone Other (See Comments)     syncope    Wellbutrin [Bupropion] Rash       Current Outpatient Medications:     atorvastatin (LIPITOR) 20 MG tablet, Take 1 tablet by mouth Daily., Disp: 30 tablet, Rfl: 11    Cholecalciferol (Vitamin D3) 50 MCG (2000 UT) capsule, Take 1 capsule by mouth Daily., Disp: , Rfl:     Magnesium 250 MG tablet, Take 1 tablet by mouth Daily., Disp: , Rfl:     Melatonin 10 MG tablet, Take 1 tablet by mouth Every Night., Disp: , Rfl:     multivitamins-minerals (PRESERVISION AREDS 2) capsule capsule, Take 1 capsule by mouth 2 (Two) Times a Day., Disp: , Rfl:     nitroglycerin (NITROSTAT) 0.4 MG SL tablet, 1 under the tongue as needed for angina, may repeat q5mins for up three doses, Disp: 25 tablet, Rfl: 11    olmesartan (BENICAR) 20 MG tablet, Take 1 tablet by mouth Daily., Disp: , Rfl:     omeprazole (priLOSEC) 40 MG capsule, Take 1 capsule by mouth Daily., Disp: , Rfl:     vitamin B-12 (CYANOCOBALAMIN) 2500 MCG sublingual tablet tablet, Place  under the tongue Daily., Disp: , Rfl:     Past Medical History:   Diagnosis Date    Coronary artery disease of native artery of native heart with stable angina pectoris 4/1/2024    Heart murmur     Hyperlipidemia     Hypertension     Sleep apnea      Social History     Socioeconomic History    Marital status:    Tobacco Use    Smoking status: Former     Types: Cigarettes    Smokeless tobacco: Never   Vaping Use    Vaping status: Never Used   Substance and Sexual Activity    Alcohol use: Never    Drug use: Never    Sexual activity: Defer       Review of Systems   Constitutional: Negative for malaise/fatigue.   HENT:  Negative for nosebleeds.    Cardiovascular:  Positive for chest pain (with exertion). Negative for dyspnea on exertion, irregular heartbeat, leg swelling,  "near-syncope, orthopnea, palpitations, paroxysmal nocturnal dyspnea and syncope.   Respiratory:  Negative for shortness of breath.    Hematologic/Lymphatic: Does not bruise/bleed easily.   Genitourinary:  Negative for hematuria.   Neurological:  Negative for dizziness and weakness.   All other systems reviewed and are negative.         Objective:     Vitals reviewed.   Constitutional:       General: Not in acute distress.     Appearance: Normal appearance. Well-developed.   Eyes:      Pupils: Pupils are equal, round, and reactive to light.   HENT:      Head: Normocephalic and atraumatic.      Nose: Nose normal.   Neck:      Vascular: No carotid bruit.   Pulmonary:      Effort: Pulmonary effort is normal. No respiratory distress.      Breath sounds: Normal breath sounds. No wheezing. No rales.   Cardiovascular:      Normal rate. Regular rhythm.      Murmurs: There is a grade 1/6 systolic murmur.   Edema:     Peripheral edema absent.   Abdominal:      General: There is no distension.      Palpations: Abdomen is soft.   Musculoskeletal: Normal range of motion.      Cervical back: Normal range of motion and neck supple. Skin:     General: Skin is warm.      Findings: No erythema or rash.   Neurological:      Mental Status: Alert and oriented to person, place, and time.   Psychiatric:         Speech: Speech normal.         Behavior: Behavior normal.         Thought Content: Thought content normal.         Judgment: Judgment normal.         /58   Pulse 77   Ht 162.6 cm (64.02\")   Wt 70.8 kg (156 lb)   SpO2 98%   BMI 26.76 kg/m²     Procedures    Lab Review:       Lab Results   Component Value Date    CHOL 155 02/16/2024    TRIG 69 02/16/2024    HDL 59 02/16/2024    LDL 82 02/16/2024       LHC 2/06/2024:  Findings:  Selective coronary angiography:  1. Left main: The left main bifurcates into the LAD and left circumflex arteries.  There is no significant disease.  2. Left anterior descending artery: The LAD runs " in the interventricular groove giving off 2 diagonals and multiple septal perforators before wrapping around the apex as an apical recurrent branch.  The mid to distal LAD is a tortuous vessel.  There is mild nonobstructive disease up to 10 to 20% in the proximal vessel.  3. Left circumflex artery: The left circumflex is nondominant for posterior circulation.  It gives off 1 small proximal and a second medium sized bifurcating obtuse marginal branch.  There are mild luminal irregularities only present.  4. Right coronary artery: The RCA is dominant for posterior circulation giving rise to PDA and right PL branches.  There is mild nonobstructive disease up to 20 to 30% in the mid vessel.        Impression:  1.  Mild nonobstructive disease only.  2.  False positive stress echo.     Plan:  -Routine post-cath care.  -Discharge home after presentation monitoring and TR band removal.  -Continue medical therapy and risk factor reduction.      I have personally reviewed LHC, labs and past office notes prior to patients visit  Assessment:          Diagnosis Plan   1. Coronary artery disease of native artery of native heart with stable angina pectoris        2. Essential hypertension        3. Dyslipidemia               Plan:       Nonobstructive CAD: symptoms are consistent with stable angina. Stress eco 2/2/2024 reported chest pain with exercise, 0.5 mm ST depression, and possible inferior hypokinesis. Overall, intermediate risk stress. LHC was done 2/16/2024 that showed mild nonobstructive CAD. Patient stopped her aspirin and metoprolol after discussing LHC results with Dr Posada. Discussed with her about starting anti-anginal medication such as Imdur to see if there was any improvement in pain. She decided to use the NTG if she had severe pain and see if there was improvement. Continue atorvastatin    2. Hypertension: Controlled in office. Continue current medications    3. Dyslipidemia: Lipid panel 1/12/2024 showed  suboptimal control with LDL at 128. Atorvastatin was increased to 20 mg.     I spent 36 minutes caring for Sasha on this date of service. This time includes time spent by me in the following activities:preparing for the visit, reviewing tests, obtaining and/or reviewing a separately obtained history, performing a medically appropriate examination and/or evaluation , counseling and educating the patient/family/caregiver, and documenting information in the medical record     Patient is to follow up in 6 months or sooner if needed

## 2024-07-17 ENCOUNTER — TRANSCRIBE ORDERS (OUTPATIENT)
Dept: ADMINISTRATIVE | Facility: HOSPITAL | Age: 74
End: 2024-07-17
Payer: MEDICARE

## 2024-07-17 DIAGNOSIS — I10 ESSENTIAL (PRIMARY) HYPERTENSION: Primary | ICD-10-CM

## 2024-07-17 DIAGNOSIS — Z13.29 ENCOUNTER FOR SCREENING FOR OTHER SUSPECTED ENDOCRINE DISORDER: ICD-10-CM

## 2024-07-17 DIAGNOSIS — E78.5 HYPERLIPIDEMIA, UNSPECIFIED HYPERLIPIDEMIA TYPE: ICD-10-CM

## 2024-07-17 DIAGNOSIS — E78.00 PURE HYPERCHOLESTEROLEMIA, UNSPECIFIED: ICD-10-CM

## 2024-07-17 DIAGNOSIS — E55.9 VITAMIN D DEFICIENCY, UNSPECIFIED: ICD-10-CM

## 2024-10-25 ENCOUNTER — CLINICAL SUPPORT (OUTPATIENT)
Dept: FAMILY MEDICINE CLINIC | Facility: CLINIC | Age: 74
End: 2024-10-25
Payer: MEDICARE

## 2024-10-25 DIAGNOSIS — Z23 NEED FOR INFLUENZA VACCINATION: Primary | ICD-10-CM

## 2024-10-25 PROCEDURE — 90662 IIV NO PRSV INCREASED AG IM: CPT | Performed by: NURSE PRACTITIONER

## 2024-10-25 PROCEDURE — G0008 ADMIN INFLUENZA VIRUS VAC: HCPCS | Performed by: NURSE PRACTITIONER

## 2024-11-18 ENCOUNTER — OFFICE VISIT (OUTPATIENT)
Dept: CARDIOLOGY | Facility: CLINIC | Age: 74
End: 2024-11-18
Payer: MEDICARE

## 2024-11-18 VITALS
BODY MASS INDEX: 25.44 KG/M2 | DIASTOLIC BLOOD PRESSURE: 60 MMHG | SYSTOLIC BLOOD PRESSURE: 130 MMHG | OXYGEN SATURATION: 98 % | WEIGHT: 149 LBS | HEART RATE: 73 BPM | HEIGHT: 64 IN

## 2024-11-18 DIAGNOSIS — E78.5 DYSLIPIDEMIA: ICD-10-CM

## 2024-11-18 DIAGNOSIS — I25.118 CORONARY ARTERY DISEASE OF NATIVE ARTERY OF NATIVE HEART WITH STABLE ANGINA PECTORIS: Primary | ICD-10-CM

## 2024-11-18 DIAGNOSIS — I10 ESSENTIAL HYPERTENSION: ICD-10-CM

## 2024-11-18 PROCEDURE — 3078F DIAST BP <80 MM HG: CPT | Performed by: INTERNAL MEDICINE

## 2024-11-18 PROCEDURE — 1160F RVW MEDS BY RX/DR IN RCRD: CPT | Performed by: INTERNAL MEDICINE

## 2024-11-18 PROCEDURE — 1159F MED LIST DOCD IN RCRD: CPT | Performed by: INTERNAL MEDICINE

## 2024-11-18 PROCEDURE — 3075F SYST BP GE 130 - 139MM HG: CPT | Performed by: INTERNAL MEDICINE

## 2024-11-18 PROCEDURE — 99214 OFFICE O/P EST MOD 30 MIN: CPT | Performed by: INTERNAL MEDICINE

## 2024-11-18 NOTE — LETTER
November 18, 2024     Cabrera Robles MD  [address]  [address]  [facility] [id number]    Patient: Sasha Jimenez   YOB: 1950   Date of Visit: 11/18/2024       Dear Cabrera Robles MD    Sasha Jimenez was in my office today. Below is a copy of my note.    If you have questions, please do not hesitate to call me. I look forward to following Sasha along with you.         Sincerely,        Mukund Posada MD        CC: No Recipients      Reason for Visit: cardiovascular follow up.    HPI:  Sasha Jimenez is a 74 y.o. female is here today for follow-up.  She follows in cardiology clinic secondary to chest pain with an abnormal stress echo done on 2/2/2024.  She underwent cardiac catheterization on 2/6/2024 showing mild nonobstructive disease only.  She still gets mild chest pain when she exerts herself.  She has taken nitroglycerin and the pain improves.  She does not want to take Imdur on a regular basis.  She reports having repeat lipid panel in July.  She does not walk much on a regular basis.  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      Previous Cardiac Testing and Procedures:  -Echo (2/10/2020) EF 56- 60%, normal diastolic function, normal RV size and function, LA dilation, no significant valve dysfunction  -Stress echo (2/2/2024) resting hypertension with hypertensive response to stress, nonlimiting chest discomfort with stress, 0.5 mm horizontal ST depression, possible inferior wall hypokinesis with stress, intermediate risk  -C (2/16/2024) mild nonobstructive disease only     Lab data:  -Lipid panel (7/25/2023) total cholesterol 196, HDL 59, , triglycerides 81  -TSH (7/25/2023) 1.19  -Lipid panel (1/12/2024) total cholesterol 197, HDL 58, , triglycerides 106  -BMP (1/12/2024) creatinine 0.73, potassium 4.8, sodium 140  -Lipid panel (2/16/2024) total cholesterol 155, HDL 59, LDL 82, triglycerides 69    Patient Active Problem List   Diagnosis   •  Essential hypertension   • Dyslipidemia   • EVIE (obstructive sleep apnea)   • GERD (gastroesophageal reflux disease)   • Chronic stable angina   • Abnormal stress echo   • Coronary artery disease of native artery of native heart with stable angina pectoris       Social History     Tobacco Use   • Smoking status: Former     Types: Cigarettes   • Smokeless tobacco: Never   Vaping Use   • Vaping status: Never Used   Substance Use Topics   • Alcohol use: Never   • Drug use: Never       Family History   Problem Relation Age of Onset   • Heart attack Father    • Heart disease Father    • Heart failure Father        The following portions of the patient's history were reviewed and updated as appropriate: allergies, current medications, past family history, past medical history, past social history, past surgical history, and problem list.      Current Outpatient Medications:   •  atorvastatin (LIPITOR) 20 MG tablet, Take 1 tablet by mouth Daily., Disp: 30 tablet, Rfl: 11  •  Cholecalciferol (Vitamin D3) 50 MCG (2000 UT) capsule, Take 1 capsule by mouth Daily., Disp: , Rfl:   •  Magnesium 250 MG tablet, Take 1 tablet by mouth Daily., Disp: , Rfl:   •  Melatonin 10 MG tablet, Take 1 tablet by mouth Every Night., Disp: , Rfl:   •  multivitamins-minerals (PRESERVISION AREDS 2) capsule capsule, Take 1 capsule by mouth 2 (Two) Times a Day., Disp: , Rfl:   •  olmesartan (BENICAR) 20 MG tablet, Take 1 tablet by mouth Daily., Disp: , Rfl:   •  omeprazole (priLOSEC) 40 MG capsule, Take 1 capsule by mouth Daily., Disp: , Rfl:   •  nitroglycerin (NITROSTAT) 0.4 MG SL tablet, 1 under the tongue as needed for angina, may repeat q5mins for up three doses (Patient not taking: Reported on 11/18/2024), Disp: 25 tablet, Rfl: 11    Review of Systems   Constitutional: Negative for chills and fever.   Cardiovascular:  Positive for chest pain. Negative for paroxysmal nocturnal dyspnea.   Respiratory:  Negative for cough and shortness of breath.   "  Skin:  Negative for rash.   Gastrointestinal:  Negative for abdominal pain and heartburn.   Neurological:  Negative for dizziness and numbness.       Objective  /60 (BP Location: Left arm, Patient Position: Sitting, Cuff Size: Adult)   Pulse 73   Ht 162.6 cm (64.02\")   Wt 67.6 kg (149 lb)   SpO2 98%   BMI 25.56 kg/m²   Constitutional:       Appearance: Well-developed.   HENT:      Head: Normocephalic and atraumatic.   Pulmonary:      Effort: Pulmonary effort is normal.      Breath sounds: Normal breath sounds.   Cardiovascular:      Normal rate. Regular rhythm.   Edema:     Peripheral edema absent.   Skin:     General: Skin is warm and dry.   Neurological:      Mental Status: Alert and oriented to person, place, and time.       Procedures      ICD-10-CM ICD-9-CM   1. Coronary artery disease of native artery of native heart with stable angina pectoris  I25.118 414.01     413.9   2. Dyslipidemia  E78.5 272.4   3. Essential hypertension  I10 401.9         Assessment/Plan:  1.  Coronary artery disease: LHC on 2/16/2024 showed mild nonobstructive disease only.  She continues to have exertional chest pain symptoms concerning for angina that does improve with nitroglycerin and rest.  There may be a component of microvascular dysfunction.  We discussed starting Imdur and she declines, preferring to not take any more medications than absolutely necessary.      2.  Dyslipidemia: Improved control on lipid panel on 2/16/2024.  Continue atorvastatin.    3.  Essential hypertension: Blood pressure is acceptable today.  Continue olmesartan.  "

## 2024-11-18 NOTE — PROGRESS NOTES
Reason for Visit: cardiovascular follow up.    HPI:  Sasha Jimenez is a 74 y.o. female is here today for follow-up.  She follows in cardiology clinic secondary to chest pain with an abnormal stress echo done on 2/2/2024.  She underwent cardiac catheterization on 2/6/2024 showing mild nonobstructive disease only.  She still gets mild chest pain when she exerts herself.  She has taken nitroglycerin and the pain improves.  She does not want to take Imdur on a regular basis.  She reports having repeat lipid panel in July.  She does not walk much on a regular basis.  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      Previous Cardiac Testing and Procedures:  -Echo (2/10/2020) EF 56- 60%, normal diastolic function, normal RV size and function, LA dilation, no significant valve dysfunction  -Stress echo (2/2/2024) resting hypertension with hypertensive response to stress, nonlimiting chest discomfort with stress, 0.5 mm horizontal ST depression, possible inferior wall hypokinesis with stress, intermediate risk  -[facility] (2/16/2024) mild nonobstructive disease only     Lab data:  -Lipid panel (7/25/2023) total cholesterol 196, HDL 59, , triglycerides 81  -TSH (7/25/2023) 1.19  -Lipid panel (1/12/2024) total cholesterol 197, HDL 58, , triglycerides 106  -BMP (1/12/2024) creatinine 0.73, potassium 4.8, sodium 140  -Lipid panel (2/16/2024) total cholesterol 155, HDL 59, LDL 82, triglycerides 69    Patient Active Problem List   Diagnosis    Essential hypertension    Dyslipidemia    EVIE (obstructive sleep apnea)    GERD (gastroesophageal reflux disease)    Chronic stable angina    Abnormal stress echo    Coronary artery disease of native artery of native heart with stable angina pectoris       Social History     Tobacco Use    Smoking status: Former     Types: Cigarettes    Smokeless tobacco: Never   Vaping Use    Vaping status: Never Used   Substance Use Topics    Alcohol use: Never    Drug use: Never  "      Family History   Problem Relation Age of Onset    Heart attack Father     Heart disease Father     Heart failure Father        The following portions of the patient's history were reviewed and updated as appropriate: allergies, current medications, past family history, past medical history, past social history, past surgical history, and problem list.      Current Outpatient Medications:     atorvastatin (LIPITOR) 20 MG tablet, Take 1 tablet by mouth Daily., Disp: 30 tablet, Rfl: 11    Cholecalciferol (Vitamin D3) 50 MCG (2000 UT) capsule, Take 1 capsule by mouth Daily., Disp: , Rfl:     Magnesium 250 MG tablet, Take 1 tablet by mouth Daily., Disp: , Rfl:     Melatonin 10 MG tablet, Take 1 tablet by mouth Every Night., Disp: , Rfl:     multivitamins-minerals (PRESERVISION AREDS 2) capsule capsule, Take 1 capsule by mouth 2 (Two) Times a Day., Disp: , Rfl:     olmesartan (BENICAR) 20 MG tablet, Take 1 tablet by mouth Daily., Disp: , Rfl:     omeprazole (priLOSEC) 40 MG capsule, Take 1 capsule by mouth Daily., Disp: , Rfl:     nitroglycerin (NITROSTAT) 0.4 MG SL tablet, 1 under the tongue as needed for angina, may repeat q5mins for up three doses (Patient not taking: Reported on 11/18/2024), Disp: 25 tablet, Rfl: 11    Review of Systems   Constitutional: Negative for chills and fever.   Cardiovascular:  Positive for chest pain. Negative for paroxysmal nocturnal dyspnea.   Respiratory:  Negative for cough and shortness of breath.    Skin:  Negative for rash.   Gastrointestinal:  Negative for abdominal pain and heartburn.   Neurological:  Negative for dizziness and numbness.       Objective   /60 (BP Location: Left arm, Patient Position: Sitting, Cuff Size: Adult)   Pulse 73   Ht 162.6 cm (64.02\")   Wt 67.6 kg (149 lb)   SpO2 98%   BMI 25.56 kg/m²   Constitutional:       Appearance: Well-developed.   HENT:      Head: Normocephalic and atraumatic.   Pulmonary:      Effort: Pulmonary effort is normal.     "  Breath sounds: Normal breath sounds.   Cardiovascular:      Normal rate. Regular rhythm.   Edema:     Peripheral edema absent.   Skin:     General: Skin is warm and dry.   Neurological:      Mental Status: Alert and oriented to person, place, and time.       Procedures      ICD-10-CM ICD-9-CM   1. Coronary artery disease of native artery of native heart with stable angina pectoris  I25.118 414.01     413.9   2. Dyslipidemia  E78.5 272.4   3. Essential hypertension  I10 401.9         Assessment/Plan:  1.  Coronary artery disease: LHC on 2/16/2024 showed mild nonobstructive disease only.  She continues to have exertional chest pain symptoms concerning for angina that does improve with nitroglycerin and rest.  There may be a component of microvascular dysfunction.  We discussed starting Imdur and she declines, preferring to not take any more medications than absolutely necessary.      2.  Dyslipidemia: Improved control on lipid panel on 2/16/2024.  Continue atorvastatin.    3.  Essential hypertension: Blood pressure is acceptable today.  Continue olmesartan.

## 2025-01-21 RX ORDER — ATORVASTATIN CALCIUM 20 MG/1
20 TABLET, FILM COATED ORAL DAILY
Qty: 90 TABLET | Refills: 3 | Status: SHIPPED | OUTPATIENT
Start: 2025-01-21

## 2025-01-24 ENCOUNTER — LAB (OUTPATIENT)
Dept: FAMILY MEDICINE CLINIC | Facility: CLINIC | Age: 75
End: 2025-01-24
Payer: MEDICARE

## 2025-01-24 DIAGNOSIS — Z13.29 ENCOUNTER FOR SCREENING FOR OTHER SUSPECTED ENDOCRINE DISORDER: ICD-10-CM

## 2025-01-24 DIAGNOSIS — E78.00 PURE HYPERCHOLESTEROLEMIA, UNSPECIFIED: ICD-10-CM

## 2025-01-24 DIAGNOSIS — I10 ESSENTIAL (PRIMARY) HYPERTENSION: ICD-10-CM

## 2025-01-24 DIAGNOSIS — E55.9 VITAMIN D DEFICIENCY, UNSPECIFIED: ICD-10-CM

## 2025-08-13 ENCOUNTER — TRANSCRIBE ORDERS (OUTPATIENT)
Dept: ADMINISTRATIVE | Facility: HOSPITAL | Age: 75
End: 2025-08-13
Payer: MEDICARE

## 2025-08-13 DIAGNOSIS — R01.1 CARDIAC MURMUR: Primary | ICD-10-CM

## 2025-08-29 ENCOUNTER — HOSPITAL ENCOUNTER (OUTPATIENT)
Dept: ULTRASOUND IMAGING | Facility: HOSPITAL | Age: 75
Discharge: HOME | End: 2025-08-29
Payer: MEDICARE

## 2025-08-29 DIAGNOSIS — R01.1 CARDIAC MURMUR: ICD-10-CM

## 2025-08-29 PROCEDURE — 93880 EXTRACRANIAL BILAT STUDY: CPT

## (undated) DEVICE — INTRO GLIDESHEATH SLENDER SS 21G .021 6F 10CM 45CM

## (undated) DEVICE — KT CONTRL HND ACIST CVI PREM HIPRESS 65

## (undated) DEVICE — KT CONTRST INJ ISOL/MANFLD W/TRANSDCR

## (undated) DEVICE — SOL IRR NACL 0.9PCT BT 1000ML

## (undated) DEVICE — DEV COMPR RAD TR BND LNG 29CM

## (undated) DEVICE — ELECTRD PAD DEFIB RADOLCNT M/FUNC PHYSIOCONTRL CONN/LD/IN A/

## (undated) DEVICE — SUP ARMBRD ART/LINE BLU

## (undated) DEVICE — PK CATH CARD 30 CA/4

## (undated) DEVICE — SOLIDIFIER LIQUI LOC PLUS 2000CC

## (undated) DEVICE — CANN NASL ETCO2 LO/FLO A/

## (undated) DEVICE — CATH CORNRY OPTITORQUE 2SH JR3.5 5F 100

## (undated) DEVICE — GW STARTER FXD CORE J .035 3X150CM 3MM

## (undated) DEVICE — DRSNG SURESITE WNDW 4X4.5

## (undated) DEVICE — DRP SURG BRACH ANGIO 38X44IN STRL